# Patient Record
Sex: MALE | Race: WHITE | Employment: OTHER | URBAN - METROPOLITAN AREA
[De-identification: names, ages, dates, MRNs, and addresses within clinical notes are randomized per-mention and may not be internally consistent; named-entity substitution may affect disease eponyms.]

---

## 2017-07-27 ENCOUNTER — APPOINTMENT (OUTPATIENT)
Dept: GENERAL RADIOLOGY | Facility: CLINIC | Age: 42
End: 2017-07-27
Attending: PHYSICIAN ASSISTANT

## 2017-07-27 ENCOUNTER — HOSPITAL ENCOUNTER (EMERGENCY)
Facility: CLINIC | Age: 42
Discharge: HOME | End: 2017-07-27
Attending: PHYSICIAN ASSISTANT | Admitting: PHYSICIAN ASSISTANT

## 2017-07-27 VITALS
RESPIRATION RATE: 16 BRPM | WEIGHT: 165 LBS | HEIGHT: 68 IN | OXYGEN SATURATION: 97 % | TEMPERATURE: 98 F | DIASTOLIC BLOOD PRESSURE: 89 MMHG | SYSTOLIC BLOOD PRESSURE: 139 MMHG | BODY MASS INDEX: 25.01 KG/M2

## 2017-07-27 DIAGNOSIS — R07.81 RIB PAIN ON RIGHT SIDE: ICD-10-CM

## 2017-07-27 DIAGNOSIS — M25.511 ACUTE PAIN OF RIGHT SHOULDER: ICD-10-CM

## 2017-07-27 PROCEDURE — 73030 X-RAY EXAM OF SHOULDER: CPT | Mod: RT

## 2017-07-27 PROCEDURE — 99214 OFFICE O/P EST MOD 30 MIN: CPT | Mod: 25

## 2017-07-27 PROCEDURE — 71020 XR CHEST 2 VW: CPT

## 2017-07-27 PROCEDURE — 99213 OFFICE O/P EST LOW 20 MIN: CPT | Performed by: PHYSICIAN ASSISTANT

## 2017-07-27 PROCEDURE — 25000128 H RX IP 250 OP 636: Performed by: PHYSICIAN ASSISTANT

## 2017-07-27 PROCEDURE — 96372 THER/PROPH/DIAG INJ SC/IM: CPT

## 2017-07-27 RX ORDER — KETOROLAC TROMETHAMINE 30 MG/ML
60 INJECTION, SOLUTION INTRAMUSCULAR; INTRAVENOUS ONCE
Status: COMPLETED | OUTPATIENT
Start: 2017-07-27 | End: 2017-07-27

## 2017-07-27 RX ADMIN — KETOROLAC TROMETHAMINE 60 MG: 30 INJECTION, SOLUTION INTRAMUSCULAR at 13:16

## 2017-07-27 NOTE — ED PROVIDER NOTES
"HPI: Marcus Austin is an 42 year old male who presents for evaluation and treatment of R sided rib and shoulder pain.  Patient [place] the horse moved and caused him to strain his R shoulder.  Patient states that he hears something tear.  He has had R sided rib pain and anterior shoulder pain since this time.  He does have some R sided rib pain with coughing and deep inspiration.  He denies any hemoptysis.  No numbness or tingling in the R arm.  No shortness of breath.        ROS:  10 point review of systems was completed and is negative unless noted in the HPI above.        Surgical History:  History reviewed. No pertinent surgical history.     Problem List:  There is no problem list on file for this patient.       Allergies:  No Known Allergies     Current Meds:  No current facility-administered medications for this encounter.   No current outpatient prescriptions on file.     PHYSICAL EXAM:     Vital signs noted and reviewed by Cornel Wiseman  /89  Temp 98  F (36.7  C) (Oral)  Resp 16  Ht 1.727 m (5' 8\")  Wt 74.8 kg (165 lb)  SpO2 97%  BMI 25.09 kg/m2     PEFR:  General appearance: healthy, alert and no distress  Ears: R TM - normal: no effusions, no erythema, and normal landmarks, L TM - normal: no effusions, no erythema, and normal landmarks  Eyes: R normal, L normal  Nose: normal  Oropharynx: normal  Neck: supple and no adenopathy  Lungs: normal, clear to auscultation and rib cage stable to palpation  Heart: S1, S2 normal, no murmur, click, rub or gallop, regular rate and rhythm  Extremities: R shoulder - no deformity, bruising, or swelling.  Reduced range of ABduction, and extension due to discomfort.       ASSESSMENT:     1. Acute pain of right shoulder    2. Rib pain on right side         PLAN:     XR per radiology read:    Chest: Cardiac silhouette and pulmonary vasculature are within normal limits. No focal airspace disease, pleural effusion " or  Pneumothorax.    Shoulder: No fracture or dislocation in the right shoulder. Mild degenerative changes in the acromioclavicular joint. No significant  soft tissue swelling.    Patient given 60 Mg Toradol IM in clinic today.  Patient would like to have further evaluation today, and was advised to proceed to [facility] urgent care for this.  Patient verbalized understanding and agreed with this plan.     Cornel Wiseman  7/27/2017, 12:04 PM       Cornel Wiseman PA-C  07/27/17 1309       Cornel Wiseman PA-C  07/27/17 1320

## 2017-07-27 NOTE — ED AVS SNAPSHOT
[facility]    [address] [facility] [address]    Phone:  [phone]    Fax:  [phone]                                       Marcus Austin   MRN: [id number]    Department:  [facility]   Date of Visit:  7/27/2017           After Visit Summary Signature Page     I have received my discharge instructions, and my questions have been answered. I have discussed any challenges I see with this plan with the nurse or doctor.    ..........................................................................................................................................  Patient/Patient Representative Signature      ..........................................................................................................................................  Patient Representative Print Name and Relationship to Patient    ..................................................               ................................................  Date                                            Time    ..........................................................................................................................................  Reviewed by Signature/Title    ...................................................              ..............................................  Date                                                            Time

## 2017-07-27 NOTE — ED AVS SNAPSHOT
[facility]    [address] [address]    Phone:  [phone]    Fax:  [phone]                                       Marcus Austin   MRN: [id number]    Department:  [facility]   Date of Visit:  7/27/2017           Patient Information     Date Of Birth          1975        Your diagnoses for this visit were:     Acute pain of right shoulder     Rib pain on right side        You were seen by Cornel Wiseman PA-C.        Discharge Instructions       Please go to [facility] walk in clinic.    Address is     [address] [place] [address]    Phone number [phone]    No ibuprofen for the next 8 hours.    24 Hour Appointment Hotline       To make an appointment at any [facility], call [phone]). If you don't have a family doctor or clinic, we will help you find one. [facility] clinics are conveniently located to serve the needs of you and your family.             Review of your medicines      Notice     You have not been prescribed any medications.            Procedures and tests performed during your visit     Chest XR,  PA & LAT    Shoulder XR, 2 view, right      Orders Needing Specimen Collection     None      Pending Results     No orders found from 7/25/2017 to 7/28/2017.            Pending Culture Results     No orders found from 7/25/2017 to 7/28/2017.            Pending Results Instructions     If you had any lab results that were not finalized at the time of your Discharge, you can call the ED Lab Result RN at [phone]. You will be contacted by this team for any positive Lab results or changes in treatment. The nurses are available 7 days a week from 10A to 6:30P.  You can leave a message 24 hours per day and they will return your call.        Test Results From Your Hospital Stay        7/27/2017 12:50 PM      Narrative     XR SHOULDER 2 VIEW RIGHT 7/27/2017 12:46  "PM    COMPARISON: None.    HISTORY: Shoulder pain.        Impression     IMPRESSION: No fracture or dislocation in the right shoulder. Mild  degenerative changes in the acromioclavicular joint. No significant  soft tissue swelling.    EDITA FELICIANO         2017 12:50 PM      Narrative     XR CHEST 2 VW 2017 12:47 PM    COMPARISON: None.    HISTORY: Right-sided rib pain, pleurodynia.        Impression     IMPRESSION: Cardiac silhouette and pulmonary vasculature are within  normal limits. No focal airspace disease, pleural effusion or  pneumothorax.    EDITA FELICIANO                Thank you for choosing [facility]       Thank you for choosing [facility] for your care. Our goal is always to provide you with excellent care. Hearing back from our patients is one way we can continue to improve our services. Please take a few minutes to complete the written survey that you may receive in the mail after you visit with us. Thank you!        [employer] Information     [employer] lets you send messages to your doctor, view your test results, renew your prescriptions, schedule appointments and more. To sign up, go to [email or web address] . Click on \"Log in\" on the left side of the screen, which will take you to the Welcome page. Then click on \"Sign up Now\" on the right side of the page.     You will be asked to enter the access code listed below, as well as some personal information. Please follow the directions to create your username and password.     Your access code is: [email or web address]  Expires: 10/25/2017  1:14 PM     Your access code will  in 90 days. If you need help or a new code, please call your [facility] clinic or [phone].        Care EveryWhere ID     This is your Care EveryWhere ID. This could be used by other organizations to access your [facility] medical records  [id number]        Equal Access to Services     SUMMER HERNNADEZ AH: viktoria Romo, sancho lozano " sulema lopez ah. So [facility] [phone].    ATENCIÓN: Si habla español, tiene a berkowitz disposición servicios gratuitos de asistencia lingüística. Llame al [phone].    We comply with applicable federal civil rights laws and [place] laws. We do not discriminate on the basis of race, color, national origin, age, disability sex, sexual orientation or gender identity.            After Visit Summary       This is your record. Keep this with you and show to your community pharmacist(s) and doctor(s) at your next visit.

## 2017-07-27 NOTE — ED NOTES
Patient here for pain in the right arm/rib area, symptoms started yesterday.  Patient presents ambulatory to the urgent care.

## 2017-07-27 NOTE — DISCHARGE INSTRUCTIONS
Please go to [facility] walk in clinic.    Address is     [address] [place] [address]    Phone number [phone]    No ibuprofen for the next 8 hours.